# Patient Record
Sex: MALE | Race: WHITE | URBAN - METROPOLITAN AREA
[De-identification: names, ages, dates, MRNs, and addresses within clinical notes are randomized per-mention and may not be internally consistent; named-entity substitution may affect disease eponyms.]

---

## 2019-05-14 ENCOUNTER — OFFICE VISIT (OUTPATIENT)
Dept: INTERNAL MEDICINE CLINIC | Age: 64
End: 2019-05-14

## 2019-05-14 VITALS
WEIGHT: 197 LBS | RESPIRATION RATE: 18 BRPM | OXYGEN SATURATION: 99 % | SYSTOLIC BLOOD PRESSURE: 102 MMHG | TEMPERATURE: 98.1 F | BODY MASS INDEX: 29.18 KG/M2 | DIASTOLIC BLOOD PRESSURE: 68 MMHG | HEIGHT: 69 IN | HEART RATE: 62 BPM

## 2019-05-14 DIAGNOSIS — Z95.2 H/O AORTIC VALVE REPLACEMENT: ICD-10-CM

## 2019-05-14 DIAGNOSIS — C61 PROSTATE CANCER (HCC): ICD-10-CM

## 2019-05-14 DIAGNOSIS — E78.2 MIXED HYPERLIPIDEMIA: ICD-10-CM

## 2019-05-14 DIAGNOSIS — L57.0 ACTINIC KERATOSES: ICD-10-CM

## 2019-05-14 DIAGNOSIS — Z11.59 ENCOUNTER FOR HEPATITIS C SCREENING TEST FOR LOW RISK PATIENT: ICD-10-CM

## 2019-05-14 DIAGNOSIS — D50.8 IRON DEFICIENCY ANEMIA SECONDARY TO INADEQUATE DIETARY IRON INTAKE: ICD-10-CM

## 2019-05-14 DIAGNOSIS — I10 ESSENTIAL HYPERTENSION: Primary | ICD-10-CM

## 2019-05-14 DIAGNOSIS — E79.0 HYPERURICEMIA: ICD-10-CM

## 2019-05-14 DIAGNOSIS — Z87.74 HISTORY OF REPAIR OF COARCTATION OF AORTA: ICD-10-CM

## 2019-05-14 DIAGNOSIS — H53.40 VISUAL FIELD CUT: ICD-10-CM

## 2019-05-14 DIAGNOSIS — I25.10 CORONARY ARTERY DISEASE INVOLVING NATIVE CORONARY ARTERY OF NATIVE HEART WITHOUT ANGINA PECTORIS: ICD-10-CM

## 2019-05-14 PROBLEM — R91.1 PULMONARY NODULE: Status: ACTIVE | Noted: 2019-05-14

## 2019-05-14 RX ORDER — TAMSULOSIN HYDROCHLORIDE 0.4 MG/1
0.4 CAPSULE ORAL DAILY
COMMUNITY

## 2019-05-14 RX ORDER — ROSUVASTATIN CALCIUM 10 MG/1
20 TABLET, COATED ORAL
Qty: 90 TAB | Refills: 3 | Status: SHIPPED | OUTPATIENT
Start: 2019-05-14

## 2019-05-14 RX ORDER — HYDROCHLOROTHIAZIDE 12.5 MG/1
12.5 TABLET ORAL DAILY
COMMUNITY
End: 2019-05-14 | Stop reason: SDUPTHER

## 2019-05-14 RX ORDER — ASPIRIN 81 MG/1
TABLET ORAL DAILY
COMMUNITY

## 2019-05-14 RX ORDER — LISINOPRIL 20 MG/1
20 TABLET ORAL DAILY
Qty: 90 TAB | Refills: 3 | Status: SHIPPED | OUTPATIENT
Start: 2019-05-14

## 2019-05-14 RX ORDER — HYDROCHLOROTHIAZIDE 12.5 MG/1
12.5 TABLET ORAL DAILY
Qty: 90 TAB | Refills: 3 | Status: SHIPPED | OUTPATIENT
Start: 2019-05-14

## 2019-05-14 RX ORDER — LISINOPRIL 20 MG/1
1 TABLET ORAL DAILY
COMMUNITY
End: 2019-05-14 | Stop reason: SDUPTHER

## 2019-05-14 NOTE — PATIENT INSTRUCTIONS
Prostate Cancer>>[facility] [place]>>PSA to be repeated and BLOSSOM to be done at that time 
 
BP>>tightly controlled>>consider stopping HCTZ Cholesterol>>needs to be repeated Iron deficiency anemia>>Dr. Campos>>WHAT IS THE SOURCE? Elevated uric acid/gout>>should improve off HCTZ Pulmonary nodule>>need repeat CT and referral to pulmonary

## 2019-05-14 NOTE — PROGRESS NOTES
HPI:  
 
This patient presents to the office for transfer of medical care with limited records from [facility] available for review. He brought in a copy of most recent laboratory testing from [facility]. 15. Medications were reconciled and information populated into EMR. He is receiving dual care through the [facility] and none of those records are available for review. He has done well since AVR in 2017 at [facility] for bicuspid aortic valve. He had remote repair of coarctation of aorta when he was a toddler. His most recent echo is not available for review. He has no limitations on physical activity. He has evidence for minimal CAD on cardiac catheterization done prior to AVR and is on risk reduction strategy. He denies any chest pain or dyspnea with vigorous activity (>4 METS). He had permanent central visual field cut OD from atheroembolic plaque from his aortic valve that predated the AVR. This remains unchanged and has not affected his ability to drive. He developed ENA prior to his AVR with endoscopic investigation revealing for nodular hyperplasia of small bowel. His anemia improved markedly following AVR but has continued to receive IV iron through Dr. Irma Calderon with last hemoglobin of 15. He has history of pulmonary nodule that was PET negative but report not on file. The date of last CT was over a year ago and I do not have the consultation note to determine if there are recommendations for follow up imaging. He finished IMRT through [facility] for treatment of Karuna 4+3 adenocarcinoma with copy of pathology report not available for review. He did not require ADT and his PSA has declined from 6.5 to 0.7. He does not have any further follow up with  since his urologist left [facility]. He denies any voiding difficulty. He was recently evaluated by dermatologist in [address] [place] [address] and received topical 5FU cream which he has applied without blistering noted. The note has not been forwarded. Past Medical History: Diagnosis Date  Actinic keratoses 5/14/2019  Allergic rhinitis  Coarctation of aorta  Coronary artery disease involving native coronary artery of native heart without angina pectoris 5/14/2019 Minimal on cath  Essential hypertension 5/14/2019  H/O prosthetic aortic valve replacement  History of repair of coarctation of aorta 5/14/2019  
 1965  Hypercholesterolemia  Hyperuricemia 5/14/2019  Mixed hyperlipidemia 5/14/2019  Prostate cancer ([address] 5/14/2019 Karuna 4+3 treated with IMRT at [facility] with pretreatment 6.5>>0.7 at conclusion of treatment  Pulmonary nodule 5/14/2019 Past Surgical History:  
Procedure Laterality Date  HX AORTIC VALVE REPLACEMENT  2017  HX COLONOSCOPY    
 2017 due in 10nyrs  HX HEART CATHETERIZATION  2017  HX HERNIA REPAIR Right   
 inguinal  
 HX MENISCECTOMY  HX VASECTOMY Current Outpatient Medications Medication Sig  tamsulosin (FLOMAX) 0.4 mg capsule Take 0.4 mg by mouth daily.  aspirin delayed-release 81 mg tablet Take  by mouth daily.  rosuvastatin (CRESTOR) 10 mg tablet Take 2 Tabs by mouth nightly. Indications: excessive fat in the blood  lisinopril (PRINIVIL, ZESTRIL) 20 mg tablet Take 1 Tab by mouth daily. Indications: high blood pressure  hydroCHLOROthiazide (HYDRODIURIL) 12.5 mg tablet Take 1 Tab by mouth daily. Indications: high blood pressure  cholecalciferol (VITAMIN D3) 1,000 unit cap Take  by mouth daily.  multivitamin (ONE A DAY) tablet Take 1 Tab by mouth daily.  omeprazole (PRILOSEC) 10 mg capsule Take 10 mg by mouth daily.  fexofenadine-pseudoephedrine (ALLEGRA-D 12 HOUR)  mg Tb12 Take 1 Tab by mouth every twelve (12) hours. No current facility-administered medications for this visit. Allergies and Intolerances: Allergies Allergen Reactions  Penicillin V Unknown (comments)  Statins-Hmg-Coa Reductase Inhibitors Other (comments)  Sulfur Unknown (comments) Family History:  
Family History Problem Relation Age of Onset  Cancer Mother Social History: He  reports that he has never smoked. He has never used smokeless tobacco.  
Social History Substance and Sexual Activity Alcohol Use Yes  Alcohol/week: 3.0 oz  Types: 5 Glasses of wine per week Immunization History:   Prevnar and Zostavax and Flu vaccine received Diet:                              Sensible Exercise:                      Biking and Weight Training Home Environment:     2 story home Occupational Risk:       No Hazards Review of Systems Constitutional: Negative for chills, fever and weight loss. HENT: Negative for hearing loss. Eyes: Negative for double vision. Respiratory: Negative for cough, shortness of breath and wheezing. Cardiovascular: Negative for chest pain. Gastrointestinal: Negative for blood in stool, heartburn and melena. Genitourinary: Negative for dysuria and urgency. Musculoskeletal: Positive for joint pain. Skin: Negative for rash. Endo/Heme/Allergies: Positive for environmental allergies. Psychiatric/Behavioral: Negative for depression and memory loss. The patient does not have insomnia. Physical:  
Visit Vitals /68 (BP 1 Location: Left arm, BP Patient Position: Sitting) Pulse 62 Temp 98.1 °F (36.7 °C) (Oral) Resp 18 Ht 5' 9\" (1.753 m) Wt 197 lb (89.4 kg) SpO2 99% BMI 29.09 kg/m² Physical Exam  
Constitutional: He is well-developed, well-nourished, and in no distress. HENT:  
Right Ear: External ear normal.  
Left Ear: External ear normal.  
Mouth/Throat: Oropharynx is clear and moist.  
Eyes: Conjunctivae are normal. No scleral icterus. Neck: No JVD present. Limited ROM neck Cardiovascular: Normal rate and regular rhythm. Murmur heard. Systolic murmur is present with a grade of 1/6. Along aortic area Pulmonary/Chest: Breath sounds normal. He has no wheezes. He has no rales. Thoracotomy and sternotomy scars Abdominal: Soft. Bowel sounds are normal. He exhibits no mass. There is no tenderness. Genitourinary:  
Genitourinary Comments: BLOSSOM declined Musculoskeletal: He exhibits no edema. Legs: 
Fine varicosities Lymphadenopathy:  
  He has no cervical adenopathy. Nursing note and vitals reviewed. Review of Data: 
Labs: Impression: 
 Patient Active Problem List  
Diagnosis  Code  Prostate cancer ([facility] [address] stage not known, with completion of IMRT without NAHT provided Obtain records from [facility] and arrange follow up with [facility] [place], PSA ordered C61  
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 Sangita Oates and alert him to this endoscopic finding 
 D50.8  Actinic keratoses Obtain dermatology consultation note L57.0  Pulmonary nodule Obtain copy of CT and arrange repeat CT and pulmonary consultation R91.1  Essential hypertension with tight control Consider stopping HCTZ I10  
 Mixed hyperlipidemia Repeat FLP ordered E78.2  Coronary artery disease (minimal) Risk reduction strategy in place I25.10  History of repair of coarctation of aorta Treatment complete Z87.74  
 Hyperuricemia Repeat uric acid level E79.0  Screening/Wellness Pneumovax 23, Shingrix, copy of colonoscopy Z95.2 Cynthia Muniz MD

## 2019-05-14 NOTE — PROGRESS NOTES
Chief Complaint Patient presents with  Hypertension  Cholesterol Problem 1. Have you been to the ER, urgent care clinic since your last visit? Hospitalized since your last visit? No 
 
2. Have you seen or consulted any other health care providers outside of the [address] [place] [address] since your last visit? Include any pap smears or colon screening.  No

## 2019-05-16 LAB
APPEARANCE UR: CLEAR
BACTERIA #/AREA URNS HPF: ABNORMAL /[HPF]
BILIRUB UR QL STRIP: NEGATIVE
CASTS URNS MICRO: ABNORMAL
CASTS URNS QL MICRO: PRESENT /LPF
CHOLEST SERPL-MCNC: 158 MG/DL (ref 100–199)
COLOR UR: YELLOW
EPI CELLS #/AREA URNS HPF: ABNORMAL /HPF (ref 0–10)
GLUCOSE UR QL: NEGATIVE
HCV AB S/CO SERPL IA: <0.1 S/CO RATIO (ref 0–0.9)
HDLC SERPL-MCNC: 53 MG/DL
HGB UR QL STRIP: NEGATIVE
KETONES UR QL STRIP: NEGATIVE
LDLC SERPL CALC-MCNC: 78 MG/DL (ref 0–99)
LEUKOCYTE ESTERASE UR QL STRIP: NEGATIVE
MICRO URNS: NORMAL
MICRO URNS: NORMAL
MUCOUS THREADS URNS QL MICRO: PRESENT
NITRITE UR QL STRIP: NEGATIVE
PH UR STRIP: 6.5 [PH] (ref 5–7.5)
PROT UR QL STRIP: NEGATIVE
PSA SERPL-MCNC: 0.7 NG/ML (ref 0–4)
RBC #/AREA URNS HPF: ABNORMAL /HPF (ref 0–2)
SP GR UR: 1.01 (ref 1–1.03)
TRIGL SERPL-MCNC: 135 MG/DL (ref 0–149)
URATE SERPL-MCNC: 8.3 MG/DL (ref 3.7–8.6)
UROBILINOGEN UR STRIP-MCNC: 0.2 MG/DL (ref 0.2–1)
VLDLC SERPL CALC-MCNC: 27 MG/DL (ref 5–40)
WBC #/AREA URNS HPF: ABNORMAL /HPF (ref 0–5)

## 2019-05-19 DIAGNOSIS — R91.1 PULMONARY NODULE: Primary | ICD-10-CM

## 2019-05-22 ENCOUNTER — TELEPHONE (OUTPATIENT)
Dept: INTERNAL MEDICINE CLINIC | Age: 64
End: 2019-05-22

## 2019-05-22 DIAGNOSIS — C61 PROSTATE CANCER (HCC): Primary | ICD-10-CM

## 2019-05-22 DIAGNOSIS — R91.1 LUNG NODULE: ICD-10-CM

## 2019-05-22 NOTE — TELEPHONE ENCOUNTER
Called pt to ask about retrieving records from the [facility] and [facility]. He is no longer  prime he is  for life with medicare so he can no longer go to the medical center. We have a referral for Pulmonology. He is requesting a referral for [facility] to continue care.

## 2019-05-24 ENCOUNTER — TELEPHONE (OUTPATIENT)
Dept: INTERNAL MEDICINE CLINIC | Age: 64
End: 2019-05-24

## 2019-05-24 NOTE — TELEPHONE ENCOUNTER
Pt called to let you know that he had his CT scan yesterday. He was looking for results. His Urology appt. Is 5/28/19. And the cariology dept. At the [address] [place] [address] is sending a 24 hour holter monitor. And he will see them after the holter monitor.

## 2019-05-28 DIAGNOSIS — N28.9 RENAL LESION: Primary | ICD-10-CM

## 2019-06-18 DIAGNOSIS — R91.1 PULMONARY NODULE: ICD-10-CM

## 2019-08-19 ENCOUNTER — TELEPHONE (OUTPATIENT)
Dept: INTERNAL MEDICINE CLINIC | Age: 64
End: 2019-08-19

## 2019-08-19 NOTE — TELEPHONE ENCOUNTER
Spoke to pt about his November appt, we went ahead an cancelled.  But he is wanting recommendaton of a new pcp

## 2019-08-27 RX ORDER — CLINDAMYCIN HYDROCHLORIDE 300 MG/1
CAPSULE ORAL
Qty: 30 CAP | Refills: 0 | Status: SHIPPED | OUTPATIENT
Start: 2019-08-27 | End: 2019-11-21

## 2019-08-27 NOTE — TELEPHONE ENCOUNTER
Pt calling with 2 medication refill requests, neither or which are on his list. He states you have prescribed them in the past at [facility]. Colchicine . 6mg tablet, 40 tabs for his gout    Clindamycin 300mg capsules, 2 caps 1 hour before his dental appt. Pt has 5 appts, so he is requesting 10 capsules.

## 2019-08-30 ENCOUNTER — TELEPHONE (OUTPATIENT)
Dept: INTERNAL MEDICINE CLINIC | Age: 64
End: 2019-08-30

## 2019-08-30 RX ORDER — COLCHICINE 0.6 MG/1
0.6 TABLET ORAL 2 TIMES DAILY
Qty: 40 TAB | Refills: 1 | Status: SHIPPED | OUTPATIENT
Start: 2019-08-30 | End: 2019-11-21

## 2019-08-30 NOTE — TELEPHONE ENCOUNTER
I informed the pt his Clindamycin was ready for , and he asked about the Colchicine . 6mg tablet, 40 tabs for his gout he had requested as well.